# Patient Record
Sex: MALE | Race: WHITE | NOT HISPANIC OR LATINO | Employment: OTHER | URBAN - METROPOLITAN AREA
[De-identification: names, ages, dates, MRNs, and addresses within clinical notes are randomized per-mention and may not be internally consistent; named-entity substitution may affect disease eponyms.]

---

## 2019-09-10 PROBLEM — M54.2 PAIN IN THE NECK: Status: ACTIVE | Noted: 2019-09-10

## 2019-09-10 PROBLEM — R56.9 CONVULSIONS: Status: ACTIVE | Noted: 2019-09-10

## 2019-09-24 PROBLEM — M79.641 BILATERAL HAND PAIN: Status: ACTIVE | Noted: 2019-09-24

## 2019-09-24 PROBLEM — M79.642 BILATERAL HAND PAIN: Status: ACTIVE | Noted: 2019-09-24

## 2019-12-12 PROBLEM — M54.10 RADICULOPATHY: Status: ACTIVE | Noted: 2019-12-12

## 2020-03-10 PROBLEM — M25.561 RIGHT KNEE PAIN: Status: ACTIVE | Noted: 2020-03-10

## 2020-06-20 PROBLEM — M50.30 DEGENERATIVE DISC DISEASE, CERVICAL: Status: ACTIVE | Noted: 2020-06-20

## 2020-06-25 PROBLEM — M48.02 CERVICAL STENOSIS OF SPINAL CANAL: Status: ACTIVE | Noted: 2020-06-25

## 2020-12-07 PROBLEM — R05.9 COUGH: Status: ACTIVE | Noted: 2020-12-07

## 2020-12-13 PROBLEM — M48.02 SPINAL STENOSIS IN CERVICAL REGION: Status: ACTIVE | Noted: 2018-06-25

## 2020-12-13 PROBLEM — R56.9 SEIZURE: Status: ACTIVE | Noted: 2018-08-13

## 2020-12-13 PROBLEM — G40.209 PARTIAL EPILEPSY WITH IMPAIRMENT OF CONSCIOUSNESS: Status: ACTIVE | Noted: 2018-08-13

## 2020-12-13 PROBLEM — R25.3 MUSCLE TWITCH: Status: ACTIVE | Noted: 2018-06-25

## 2021-03-04 PROBLEM — F41.0 PANIC DISORDER: Status: ACTIVE | Noted: 2021-03-04

## 2021-03-04 PROBLEM — F42.9 OBSESSIVE-COMPULSIVE DISORDER: Status: ACTIVE | Noted: 2021-03-04

## 2021-03-04 PROBLEM — F33.1 MODERATE EPISODE OF RECURRENT MAJOR DEPRESSIVE DISORDER: Status: ACTIVE | Noted: 2021-03-04

## 2021-03-31 PROBLEM — G89.29 CHRONIC BILATERAL LOW BACK PAIN WITH BILATERAL SCIATICA: Status: ACTIVE | Noted: 2021-03-31

## 2021-03-31 PROBLEM — M54.41 CHRONIC BILATERAL LOW BACK PAIN WITH BILATERAL SCIATICA: Status: ACTIVE | Noted: 2021-03-31

## 2021-03-31 PROBLEM — M54.42 CHRONIC BILATERAL LOW BACK PAIN WITH BILATERAL SCIATICA: Status: ACTIVE | Noted: 2021-03-31

## 2021-04-28 DIAGNOSIS — Z12.11 COLON CANCER SCREENING: ICD-10-CM

## 2021-04-30 PROBLEM — M25.561 BILATERAL CHRONIC KNEE PAIN: Status: ACTIVE | Noted: 2021-04-30

## 2021-04-30 PROBLEM — M25.562 BILATERAL CHRONIC KNEE PAIN: Status: ACTIVE | Noted: 2021-04-30

## 2021-04-30 PROBLEM — G89.29 BILATERAL CHRONIC KNEE PAIN: Status: ACTIVE | Noted: 2021-04-30

## 2021-05-04 PROBLEM — G47.00 INSOMNIA: Status: ACTIVE | Noted: 2021-05-04

## 2021-05-21 ENCOUNTER — PATIENT OUTREACH (OUTPATIENT)
Dept: ADMINISTRATIVE | Facility: HOSPITAL | Age: 56
End: 2021-05-21

## 2021-07-09 PROBLEM — F41.1 GENERALIZED ANXIETY DISORDER: Status: ACTIVE | Noted: 2021-07-09

## 2021-07-15 ENCOUNTER — PATIENT OUTREACH (OUTPATIENT)
Dept: ADMINISTRATIVE | Facility: HOSPITAL | Age: 56
End: 2021-07-15

## 2021-07-15 DIAGNOSIS — Z12.12 ENCOUNTER FOR COLORECTAL CANCER SCREENING: Primary | ICD-10-CM

## 2021-07-15 DIAGNOSIS — Z12.11 ENCOUNTER FOR COLORECTAL CANCER SCREENING: Primary | ICD-10-CM

## 2022-09-30 PROBLEM — R53.83 FATIGUE: Status: ACTIVE | Noted: 2022-09-30

## 2022-09-30 PROBLEM — H69.03 PATULOUS EUSTACHIAN TUBE OF BOTH EARS: Status: ACTIVE | Noted: 2022-09-30

## 2022-09-30 PROBLEM — R73.09 ELEVATED HEMOGLOBIN A1C: Status: ACTIVE | Noted: 2022-09-30

## 2022-09-30 PROBLEM — E78.1 HYPERTRIGLYCERIDEMIA: Status: ACTIVE | Noted: 2022-09-30

## 2022-09-30 PROBLEM — L81.9 PIGMENTED SKIN LESION: Status: ACTIVE | Noted: 2022-09-30

## 2022-09-30 PROBLEM — Z79.899 LONG TERM CURRENT USE OF ANTIPSYCHOTIC MEDICATION: Status: ACTIVE | Noted: 2022-09-30

## 2023-03-08 ENCOUNTER — TELEPHONE (OUTPATIENT)
Dept: SMOKING CESSATION | Facility: CLINIC | Age: 58
End: 2023-03-08

## 2023-03-16 ENCOUNTER — CLINICAL SUPPORT (OUTPATIENT)
Dept: SMOKING CESSATION | Facility: CLINIC | Age: 58
End: 2023-03-16
Payer: COMMERCIAL

## 2023-03-16 DIAGNOSIS — F17.200 NICOTINE DEPENDENCE: Primary | ICD-10-CM

## 2023-03-16 PROCEDURE — 99404 PR PREVENT COUNSEL,INDIV,60 MIN: ICD-10-PCS | Mod: S$GLB,,,

## 2023-03-16 PROCEDURE — 99404 PREV MED CNSL INDIV APPRX 60: CPT | Mod: S$GLB,,,

## 2023-03-16 NOTE — PROGRESS NOTES
Patient smokes 1 ppd and enjoys it.  He smokes in his home. He lights his first cig within 5 minutes.  He reports not being bored. He wants to stop smoking for his health and he is in pain mgmt. Patches were already prescribed  by PCP and picked up.  He will start them in the morning.

## 2023-03-27 ENCOUNTER — CLINICAL SUPPORT (OUTPATIENT)
Dept: SMOKING CESSATION | Facility: CLINIC | Age: 58
End: 2023-03-27
Payer: COMMERCIAL

## 2023-03-27 DIAGNOSIS — F17.200 NICOTINE DEPENDENCE: Primary | ICD-10-CM

## 2023-03-27 PROCEDURE — 99404 PREV MED CNSL INDIV APPRX 60: CPT | Mod: S$GLB,,,

## 2023-03-27 PROCEDURE — 99404 PR PREVENT COUNSEL,INDIV,60 MIN: ICD-10-PCS | Mod: S$GLB,,,

## 2023-03-27 RX ORDER — DM/P-EPHED/ACETAMINOPH/DOXYLAM 30-7.5/3
4 LIQUID (ML) ORAL
Qty: 168 LOZENGE | Refills: 0 | Status: SHIPPED | OUTPATIENT
Start: 2023-03-27

## 2023-03-27 RX ORDER — IBUPROFEN 200 MG
1 TABLET ORAL DAILY
Qty: 14 PATCH | Refills: 0 | Status: SHIPPED | OUTPATIENT
Start: 2023-03-27

## 2023-03-27 NOTE — PROGRESS NOTES
Individual Follow-Up Form    3/27/2023    Quit Date: TBD    Clinical Status of Patient: Outpatient    Length of Service: 60 minutes    Continuing Medication: yes  Patches or Nicotine Lozenges    Other Medications: none     Target Symptoms: Withdrawal and medication side effects. The following were  rated moderate (3) to severe (4) on TCRS:  Moderate (3): none  Severe (4): none    Comments: Pt has had a slight reduction in smoking.  He also stated that he dips half a can a day.  Will add additional patch and lozenges to meet nicotine dependence.   The patient will continue with  therapy sessions and medication monitoring by CTTS. Prescribed medication management will be by physician.  completion of TCRS (Tobacco Cessation Rating Scale) reviewed strategies, cues, and triggers. Introduced the negative impact of tobacco on health, the health advantages of discontinuing the use of tobacco, time line improved health changes after a quit, withdrawal issues to expect from nicotine and habit, and ways to achieve the goal of a quit.  The patient denies any abnormal behavioral or mental changes at this time.       Diagnosis: F17.200    Next Visit: 1 week

## 2023-04-03 ENCOUNTER — TELEPHONE (OUTPATIENT)
Dept: SMOKING CESSATION | Facility: CLINIC | Age: 58
End: 2023-04-03

## 2023-04-04 ENCOUNTER — CLINICAL SUPPORT (OUTPATIENT)
Dept: SMOKING CESSATION | Facility: CLINIC | Age: 58
End: 2023-04-04
Payer: COMMERCIAL

## 2023-04-04 ENCOUNTER — TELEPHONE (OUTPATIENT)
Dept: SMOKING CESSATION | Facility: CLINIC | Age: 58
End: 2023-04-04
Payer: COMMERCIAL

## 2023-04-04 DIAGNOSIS — F17.200 NICOTINE DEPENDENCE: Primary | ICD-10-CM

## 2023-04-04 DIAGNOSIS — Z72.0 TOBACCO ABUSE: ICD-10-CM

## 2023-04-04 PROCEDURE — 99402 PREV MED CNSL INDIV APPRX 30: CPT | Mod: S$GLB,,,

## 2023-04-04 PROCEDURE — 99402 PR PREVENT COUNSEL,INDIV,30 MIN: ICD-10-PCS | Mod: S$GLB,,,

## 2023-04-04 RX ORDER — IBUPROFEN 200 MG
1 TABLET ORAL DAILY
Qty: 28 PATCH | Refills: 1 | Status: SHIPPED | OUTPATIENT
Start: 2023-04-04

## 2023-04-04 NOTE — PROGRESS NOTES
Individual Follow-Up Form    4/4/2023    Quit Date: TBD    Clinical Status of Patient: Outpatient    Length of Service: 30 minutes    Continuing Medication: yes  Patches or Nicotine Lozenges    Other Medications: none     Target Symptoms: Withdrawal and medication side effects. The following were  rated moderate (3) to severe (4) on TCRS:  Moderate (3): none  Severe (4): none    Comments: Pt is smoking 16-17 cpd down from 1ppd. He has trouble with patches staying on through sweat and I advised him where to place it.  He is using the lozenges although doesn't care for them. He is optimistic about the process of cutting down and staying consistent with counseling to help.  The patient will continue with  therapy sessions and medication monitoring by CTTS. Prescribed medication management will be by physician.  completion of TCRS (Tobacco Cessation Rating Scale) reviewed strategies, controlling environment, cues, triggers, new goals set. Introduced high risk situations with preparation interventions, caffeine similarities with withdrawal issues of habit and nicotine, Alcohol, Understanding urges, cravings, stress and relaxation. Open discussion with intervention discussion.  The patient denies any abnormal behavioral or mental changes at this time.       Diagnosis: F17.200    Next Visit: 1 week

## 2023-04-17 ENCOUNTER — TELEPHONE (OUTPATIENT)
Dept: SMOKING CESSATION | Facility: CLINIC | Age: 58
End: 2023-04-17
Payer: COMMERCIAL

## 2023-04-17 ENCOUNTER — CLINICAL SUPPORT (OUTPATIENT)
Dept: SMOKING CESSATION | Facility: CLINIC | Age: 58
End: 2023-04-17
Payer: COMMERCIAL

## 2023-04-17 DIAGNOSIS — F17.200 NICOTINE DEPENDENCE: Primary | ICD-10-CM

## 2023-04-17 PROCEDURE — 99402 PR PREVENT COUNSEL,INDIV,30 MIN: ICD-10-PCS | Mod: S$GLB,,, | Performed by: GENERAL PRACTICE

## 2023-04-17 PROCEDURE — 99402 PREV MED CNSL INDIV APPRX 30: CPT | Mod: S$GLB,,, | Performed by: GENERAL PRACTICE

## 2023-04-17 NOTE — TELEPHONE ENCOUNTER
Attempt to contact patient for a scheduled telephone follow up. Recorded message left with return contact information.

## 2023-04-17 NOTE — PROGRESS NOTES
Individual Follow-Up Form    4/17/2023    Quit Date: TBD    Clinical Status of Patient: Outpatient    Length of Service: 30 minutes    Continuing Medication: yes  Patches or Nicotine Lozenges    Other Medications: none     Target Symptoms: Withdrawal and medication side effects. The following were  rated moderate (3) to severe (4) on TCRS:  Moderate (3): desire tobacco, restless, irritable  Severe (4): none    Comments: Spoke with patient at length in regards to his smoking cessation progress. He states that he is not tobacco free at this time and feels that he needs to quit the program. Her stated that the patches do not stick to the small of his back where he was instructed to place them and feels that he is wasting our time by checking on him. He states that he has been able to limit his smoking to 3/4 pack per day. He stated that he is on disability and stays in his recliner most of the day. He feels that his smoking is both the need for the nicotine but also something for him to do during the day while watching TV. Discussed placing his patch on the inside of his ankle where there is no hair and placing a tube sock over his ankle. Discussed using the nicotine lozenges before smoking a cigarette and  his normal activities when he smokes. He stated that the hardest time to refrain from smoking is when he is drinking his coffee. Discussed drinking his coffee in a different location and using a lozenges prior to his coffee. Reviewed coping strategies and encouraged setting daily challenges and goals. He verbalized understanding and stated that he is willing to try putting the patch on a different part of his body and using his lozenges first then waiting at least 30 minutes before smoking. He feels irritable only due to his physical limitations and the patches not staying on while he is trying to quit smoking. Congratulated him on making progress in reducing to 3/4 of a pack and for staying committed to  his desire to quit. Will continue to encourage and monitor progress.    Diagnosis: F17.200    Next Visit: 1 week

## 2023-04-24 ENCOUNTER — TELEPHONE (OUTPATIENT)
Dept: SMOKING CESSATION | Facility: CLINIC | Age: 58
End: 2023-04-24
Payer: COMMERCIAL

## 2023-04-24 NOTE — TELEPHONE ENCOUNTER
Spoke with patient briefly in regards to his scheduled telephone appointment. He states that he is on the other line and will have to call me back. Call disconnected.

## 2023-05-08 ENCOUNTER — TELEPHONE (OUTPATIENT)
Dept: SMOKING CESSATION | Facility: CLINIC | Age: 58
End: 2023-05-08
Payer: COMMERCIAL

## 2023-05-08 NOTE — TELEPHONE ENCOUNTER
Attempt to contact patient for a scheduled telephone follow up. No valid telephone number listed on chart.

## 2023-05-24 ENCOUNTER — TELEPHONE (OUTPATIENT)
Dept: SMOKING CESSATION | Facility: CLINIC | Age: 58
End: 2023-05-24
Payer: COMMERCIAL

## 2023-05-26 ENCOUNTER — TELEPHONE (OUTPATIENT)
Dept: SMOKING CESSATION | Facility: CLINIC | Age: 58
End: 2023-05-26
Payer: COMMERCIAL

## 2023-06-01 ENCOUNTER — TELEPHONE (OUTPATIENT)
Dept: SMOKING CESSATION | Facility: CLINIC | Age: 58
End: 2023-06-01
Payer: COMMERCIAL

## 2023-07-12 ENCOUNTER — TELEPHONE (OUTPATIENT)
Dept: SMOKING CESSATION | Facility: CLINIC | Age: 58
End: 2023-07-12
Payer: COMMERCIAL

## 2023-07-12 NOTE — TELEPHONE ENCOUNTER
Third attempt left message regarding smoking cessation quit 1 episode, will call back at a later time.

## 2023-08-29 ENCOUNTER — PATIENT OUTREACH (OUTPATIENT)
Dept: ADMINISTRATIVE | Facility: HOSPITAL | Age: 58
End: 2023-08-29
Payer: COMMERCIAL

## 2023-09-25 ENCOUNTER — CLINICAL SUPPORT (OUTPATIENT)
Dept: SMOKING CESSATION | Facility: CLINIC | Age: 58
End: 2023-09-25
Payer: COMMERCIAL

## 2023-09-25 DIAGNOSIS — F17.200 NICOTINE DEPENDENCE: Primary | ICD-10-CM

## 2023-09-25 PROCEDURE — 99999 PR PBB SHADOW E&M-EST. PATIENT-LVL I: CPT | Mod: PBBFAC,,,

## 2023-09-25 PROCEDURE — 99406 PR TOBACCO USE CESSATION INTERMEDIATE 3-10 MINUTES: ICD-10-PCS | Mod: S$GLB,,,

## 2023-09-25 PROCEDURE — 99406 BEHAV CHNG SMOKING 3-10 MIN: CPT | Mod: S$GLB,,,

## 2023-09-25 PROCEDURE — 99999 PR PBB SHADOW E&M-EST. PATIENT-LVL I: ICD-10-PCS | Mod: PBBFAC,,,

## 2023-09-25 NOTE — PROGRESS NOTES
Spoke with patient today in regard to smoking cessation progress for 6 month telephone follow up, he states not tobacco free.  Patient states not interested in scheduling for the program at this time.  Patient states he will contact me at a later time.  Informed patient of benefit period and contact information if any further help or support is needed.  Will complete smart form for 6 month follow up on Quit attempt #1.

## 2024-04-09 ENCOUNTER — PATIENT OUTREACH (OUTPATIENT)
Dept: ADMINISTRATIVE | Facility: HOSPITAL | Age: 59
End: 2024-04-09
Payer: COMMERCIAL

## 2024-05-20 ENCOUNTER — PATIENT OUTREACH (OUTPATIENT)
Dept: ADMINISTRATIVE | Facility: HOSPITAL | Age: 59
End: 2024-05-20
Payer: COMMERCIAL

## 2024-05-20 DIAGNOSIS — E11.9 TYPE 2 DIABETES MELLITUS WITHOUT COMPLICATION, WITHOUT LONG-TERM CURRENT USE OF INSULIN: Primary | ICD-10-CM

## 2024-05-29 PROBLEM — G47.9 SLEEP DISORDER: Status: ACTIVE | Noted: 2024-05-29

## 2024-05-29 PROBLEM — F33.2 MAJOR DEPRESSIVE DISORDER, RECURRENT SEVERE WITHOUT PSYCHOTIC FEATURES: Status: ACTIVE | Noted: 2024-05-29

## 2024-09-03 ENCOUNTER — PATIENT OUTREACH (OUTPATIENT)
Dept: ADMINISTRATIVE | Facility: HOSPITAL | Age: 59
End: 2024-09-03
Payer: COMMERCIAL

## 2024-09-03 DIAGNOSIS — E11.9 TYPE 2 DIABETES MELLITUS WITHOUT COMPLICATION, WITHOUT LONG-TERM CURRENT USE OF INSULIN: Primary | ICD-10-CM

## 2024-10-31 ENCOUNTER — PATIENT OUTREACH (OUTPATIENT)
Dept: ADMINISTRATIVE | Facility: HOSPITAL | Age: 59
End: 2024-10-31
Payer: COMMERCIAL

## 2024-11-05 ENCOUNTER — PATIENT MESSAGE (OUTPATIENT)
Dept: RESEARCH | Facility: HOSPITAL | Age: 59
End: 2024-11-05
Payer: COMMERCIAL

## 2024-11-12 ENCOUNTER — PATIENT OUTREACH (OUTPATIENT)
Dept: ADMINISTRATIVE | Facility: HOSPITAL | Age: 59
End: 2024-11-12
Payer: COMMERCIAL